# Patient Record
Sex: FEMALE | Race: WHITE | NOT HISPANIC OR LATINO | Employment: OTHER | URBAN - METROPOLITAN AREA
[De-identification: names, ages, dates, MRNs, and addresses within clinical notes are randomized per-mention and may not be internally consistent; named-entity substitution may affect disease eponyms.]

---

## 2020-08-13 ENCOUNTER — HOSPITAL ENCOUNTER (OUTPATIENT)
Facility: HOSPITAL | Age: 75
Setting detail: OBSERVATION
Discharge: HOME | End: 2020-08-14
Attending: EMERGENCY MEDICINE | Admitting: INTERNAL MEDICINE
Payer: COMMERCIAL

## 2020-08-13 DIAGNOSIS — T78.40XA ALLERGIC REACTION, INITIAL ENCOUNTER: ICD-10-CM

## 2020-08-13 DIAGNOSIS — K13.79 UVULAR SWELLING: ICD-10-CM

## 2020-08-13 DIAGNOSIS — K13.79 UVULAR EDEMA: Primary | ICD-10-CM

## 2020-08-13 PROBLEM — T78.2XXA ANAPHYLAXIS: Status: ACTIVE | Noted: 2020-08-13

## 2020-08-13 LAB
ALBUMIN SERPL BCP-MCNC: 3.7 G/DL (ref 3.5–5)
ALP SERPL-CCNC: 76 U/L (ref 46–116)
ALT SERPL W P-5'-P-CCNC: 17 U/L (ref 12–78)
ANION GAP SERPL CALCULATED.3IONS-SCNC: 15 MMOL/L (ref 4–13)
AST SERPL W P-5'-P-CCNC: 11 U/L (ref 5–45)
BASOPHILS # BLD AUTO: 0.05 THOUSANDS/ΜL (ref 0–0.1)
BASOPHILS NFR BLD AUTO: 0 % (ref 0–1)
BILIRUB SERPL-MCNC: 0.19 MG/DL (ref 0.2–1)
BUN SERPL-MCNC: 16 MG/DL (ref 5–25)
CALCIUM SERPL-MCNC: 8.5 MG/DL (ref 8.3–10.1)
CHLORIDE SERPL-SCNC: 104 MMOL/L (ref 100–108)
CO2 SERPL-SCNC: 19 MMOL/L (ref 21–32)
CREAT SERPL-MCNC: 1.39 MG/DL (ref 0.6–1.3)
EOSINOPHIL # BLD AUTO: 0.01 THOUSAND/ΜL (ref 0–0.61)
EOSINOPHIL NFR BLD AUTO: 0 % (ref 0–6)
ERYTHROCYTE [DISTWIDTH] IN BLOOD BY AUTOMATED COUNT: 12.4 % (ref 11.6–15.1)
GFR SERPL CREATININE-BSD FRML MDRD: 37 ML/MIN/1.73SQ M
GLUCOSE SERPL-MCNC: 120 MG/DL (ref 65–140)
HCT VFR BLD AUTO: 36.2 % (ref 34.8–46.1)
HGB BLD-MCNC: 11.6 G/DL (ref 11.5–15.4)
IMM GRANULOCYTES # BLD AUTO: 0.05 THOUSAND/UL (ref 0–0.2)
IMM GRANULOCYTES NFR BLD AUTO: 0 % (ref 0–2)
LYMPHOCYTES # BLD AUTO: 0.94 THOUSANDS/ΜL (ref 0.6–4.47)
LYMPHOCYTES NFR BLD AUTO: 8 % (ref 14–44)
MCH RBC QN AUTO: 29.8 PG (ref 26.8–34.3)
MCHC RBC AUTO-ENTMCNC: 32 G/DL (ref 31.4–37.4)
MCV RBC AUTO: 93 FL (ref 82–98)
MONOCYTES # BLD AUTO: 0.46 THOUSAND/ΜL (ref 0.17–1.22)
MONOCYTES NFR BLD AUTO: 4 % (ref 4–12)
NEUTROPHILS # BLD AUTO: 10.12 THOUSANDS/ΜL (ref 1.85–7.62)
NEUTS SEG NFR BLD AUTO: 88 % (ref 43–75)
NRBC BLD AUTO-RTO: 0 /100 WBCS
PLATELET # BLD AUTO: 206 THOUSANDS/UL (ref 149–390)
PMV BLD AUTO: 10.4 FL (ref 8.9–12.7)
POTASSIUM SERPL-SCNC: 3.8 MMOL/L (ref 3.5–5.3)
PROT SERPL-MCNC: 7 G/DL (ref 6.4–8.2)
RBC # BLD AUTO: 3.89 MILLION/UL (ref 3.81–5.12)
SODIUM SERPL-SCNC: 138 MMOL/L (ref 136–145)
WBC # BLD AUTO: 11.63 THOUSAND/UL (ref 4.31–10.16)

## 2020-08-13 PROCEDURE — 94644 CONT INHLJ TX 1ST HOUR: CPT

## 2020-08-13 PROCEDURE — 94664 DEMO&/EVAL PT USE INHALER: CPT

## 2020-08-13 PROCEDURE — 96374 THER/PROPH/DIAG INJ IV PUSH: CPT

## 2020-08-13 PROCEDURE — 96375 TX/PRO/DX INJ NEW DRUG ADDON: CPT

## 2020-08-13 PROCEDURE — 96361 HYDRATE IV INFUSION ADD-ON: CPT

## 2020-08-13 PROCEDURE — 99285 EMERGENCY DEPT VISIT HI MDM: CPT | Performed by: EMERGENCY MEDICINE

## 2020-08-13 PROCEDURE — 85025 COMPLETE CBC W/AUTO DIFF WBC: CPT | Performed by: PHYSICIAN ASSISTANT

## 2020-08-13 PROCEDURE — 96372 THER/PROPH/DIAG INJ SC/IM: CPT

## 2020-08-13 PROCEDURE — 94762 N-INVAS EAR/PLS OXIMTRY CONT: CPT

## 2020-08-13 PROCEDURE — 94760 N-INVAS EAR/PLS OXIMETRY 1: CPT

## 2020-08-13 PROCEDURE — 80053 COMPREHEN METABOLIC PANEL: CPT | Performed by: PHYSICIAN ASSISTANT

## 2020-08-13 PROCEDURE — 99285 EMERGENCY DEPT VISIT HI MDM: CPT

## 2020-08-13 PROCEDURE — 99220 PR INITIAL OBSERVATION CARE/DAY 70 MINUTES: CPT | Performed by: INTERNAL MEDICINE

## 2020-08-13 PROCEDURE — 36415 COLL VENOUS BLD VENIPUNCTURE: CPT | Performed by: PHYSICIAN ASSISTANT

## 2020-08-13 RX ORDER — DIPHENHYDRAMINE HYDROCHLORIDE 50 MG/ML
50 INJECTION INTRAMUSCULAR; INTRAVENOUS EVERY 8 HOURS
Status: DISCONTINUED | OUTPATIENT
Start: 2020-08-13 | End: 2020-08-14 | Stop reason: HOSPADM

## 2020-08-13 RX ORDER — ACETAMINOPHEN 325 MG/1
650 TABLET ORAL EVERY 6 HOURS PRN
Status: DISCONTINUED | OUTPATIENT
Start: 2020-08-13 | End: 2020-08-14 | Stop reason: HOSPADM

## 2020-08-13 RX ORDER — HEPARIN SODIUM 5000 [USP'U]/ML
5000 INJECTION, SOLUTION INTRAVENOUS; SUBCUTANEOUS EVERY 8 HOURS SCHEDULED
Status: DISCONTINUED | OUTPATIENT
Start: 2020-08-13 | End: 2020-08-14 | Stop reason: HOSPADM

## 2020-08-13 RX ORDER — PRAVASTATIN SODIUM 40 MG
40 TABLET ORAL
Status: DISCONTINUED | OUTPATIENT
Start: 2020-08-13 | End: 2020-08-14 | Stop reason: HOSPADM

## 2020-08-13 RX ORDER — METHYLPREDNISOLONE SODIUM SUCCINATE 40 MG/ML
40 INJECTION, POWDER, LYOPHILIZED, FOR SOLUTION INTRAMUSCULAR; INTRAVENOUS EVERY 12 HOURS SCHEDULED
Status: DISCONTINUED | OUTPATIENT
Start: 2020-08-14 | End: 2020-08-14 | Stop reason: HOSPADM

## 2020-08-13 RX ORDER — SIMVASTATIN 20 MG
20 TABLET ORAL
COMMUNITY

## 2020-08-13 RX ORDER — ONDANSETRON 2 MG/ML
4 INJECTION INTRAMUSCULAR; INTRAVENOUS EVERY 6 HOURS PRN
Status: DISCONTINUED | OUTPATIENT
Start: 2020-08-13 | End: 2020-08-14 | Stop reason: HOSPADM

## 2020-08-13 RX ORDER — LISINOPRIL 10 MG/1
10 TABLET ORAL
COMMUNITY

## 2020-08-13 RX ORDER — SODIUM CHLORIDE FOR INHALATION 0.9 %
12 VIAL, NEBULIZER (ML) INHALATION ONCE
Status: COMPLETED | OUTPATIENT
Start: 2020-08-13 | End: 2020-08-13

## 2020-08-13 RX ORDER — ALBUTEROL SULFATE 2.5 MG/3ML
2.5 SOLUTION RESPIRATORY (INHALATION) EVERY 6 HOURS PRN
Status: DISCONTINUED | OUTPATIENT
Start: 2020-08-13 | End: 2020-08-14 | Stop reason: HOSPADM

## 2020-08-13 RX ORDER — KETOROLAC TROMETHAMINE 30 MG/ML
15 INJECTION, SOLUTION INTRAMUSCULAR; INTRAVENOUS ONCE
Status: COMPLETED | OUTPATIENT
Start: 2020-08-13 | End: 2020-08-13

## 2020-08-13 RX ORDER — LISINOPRIL 10 MG/1
10 TABLET ORAL
Status: DISCONTINUED | OUTPATIENT
Start: 2020-08-14 | End: 2020-08-14 | Stop reason: HOSPADM

## 2020-08-13 RX ORDER — SODIUM CHLORIDE 9 MG/ML
125 INJECTION, SOLUTION INTRAVENOUS CONTINUOUS
Status: DISCONTINUED | OUTPATIENT
Start: 2020-08-13 | End: 2020-08-14

## 2020-08-13 RX ORDER — EPINEPHRINE 1 MG/ML
0.3 INJECTION, SOLUTION, CONCENTRATE INTRAVENOUS ONCE
Status: COMPLETED | OUTPATIENT
Start: 2020-08-13 | End: 2020-08-13

## 2020-08-13 RX ORDER — LEVOTHYROXINE SODIUM 88 UG/1
88 TABLET ORAL
Status: DISCONTINUED | OUTPATIENT
Start: 2020-08-14 | End: 2020-08-14 | Stop reason: HOSPADM

## 2020-08-13 RX ADMIN — ALBUTEROL SULFATE 10 MG: 2.5 SOLUTION RESPIRATORY (INHALATION) at 12:52

## 2020-08-13 RX ADMIN — SODIUM CHLORIDE 125 ML/HR: 0.9 INJECTION, SOLUTION INTRAVENOUS at 18:00

## 2020-08-13 RX ADMIN — PRAVASTATIN SODIUM 40 MG: 40 TABLET ORAL at 18:12

## 2020-08-13 RX ADMIN — FAMOTIDINE 20 MG: 10 INJECTION INTRAVENOUS at 12:44

## 2020-08-13 RX ADMIN — ISODIUM CHLORIDE 12 ML: 0.03 SOLUTION RESPIRATORY (INHALATION) at 12:52

## 2020-08-13 RX ADMIN — FAMOTIDINE 20 MG: 10 INJECTION INTRAVENOUS at 18:09

## 2020-08-13 RX ADMIN — HEPARIN SODIUM 5000 UNITS: 5000 INJECTION INTRAVENOUS; SUBCUTANEOUS at 21:15

## 2020-08-13 RX ADMIN — SODIUM CHLORIDE 1000 ML: 0.9 INJECTION, SOLUTION INTRAVENOUS at 12:42

## 2020-08-13 RX ADMIN — KETOROLAC TROMETHAMINE 15 MG: 30 INJECTION, SOLUTION INTRAMUSCULAR at 12:47

## 2020-08-13 RX ADMIN — DIPHENHYDRAMINE HYDROCHLORIDE 50 MG: 50 INJECTION, SOLUTION INTRAMUSCULAR; INTRAVENOUS at 18:51

## 2020-08-13 RX ADMIN — EPINEPHRINE 0.3 MG: 1 INJECTION, SOLUTION, CONCENTRATE INTRAVENOUS at 12:42

## 2020-08-13 RX ADMIN — HEPARIN SODIUM 5000 UNITS: 5000 INJECTION INTRAVENOUS; SUBCUTANEOUS at 18:14

## 2020-08-13 RX ADMIN — IPRATROPIUM BROMIDE 1 MG: 0.5 SOLUTION RESPIRATORY (INHALATION) at 12:52

## 2020-08-13 NOTE — ASSESSMENT & PLAN NOTE
· Monitor respiratory status closely in the setting of anaphylactic reaction  · No wheezing at this time, per ER notation was wheezing on arrival

## 2020-08-13 NOTE — ASSESSMENT & PLAN NOTE
· Continue lisinopril with hold parameters  · 151/65  · Monitor for any evidence of hypotension  · IVF hydration  · Frequent BP checks

## 2020-08-13 NOTE — ED PROVIDER NOTES
History  Chief Complaint   Patient presents with    Allergic Reaction     Pt has allergy shots today  New dust mite serum  Attempted to go home but first symptom was itching on roof of mouth/throat and eye swelling  Pt had 2 subq doses of epi(0 1mg), 50mg benadryl IM, and 80mg decadrom IM in the office  Pt had an additional 50mg benadryl IV via EMS  HPI   78-year-old female with history of asthma, hypothyroidism, hyperlipidemia, hypertension presenting to the emergency department with acute onset of eye swelling, mouth/throat itching, difficulty swallowing, muffled voice 10-15 minutes after she received an allergy shot today  Patient received a new dust mite serum  When symptoms 1st started, she returned to the allergist office where she received 2 subQ doses of 0 1 mg epinephrine, 50 mg Benadryl IV, and 80 mg IM Decadron at the office  When symptoms did not immediately resolve, EMS was called  Patient received an additional 50 mg IV Benadryl by EMS  Upon arrival, patient complains of a slightly swollen sensation in the back of her throat and eyelid swelling  Denies chest pain, shortness of breath  Prior to this, patient was in her state of usual health  Denies recent history of fever, headache, visual changes, neck pain, cough, sore throat, nausea, vomiting, abdominal pain, urinary symptoms, changes in stool, leg pain or leg swelling, rash  No sick contacts, no recent travel  Patient states that she has never had an allergy reaction or anaphylactic reaction like this before  Prior to Admission Medications   Prescriptions Last Dose Informant Patient Reported? Taking?    Albuterol Sulfate (PROAIR HFA IN)   Yes Yes   Sig: Inhale   Levothyroxine Sodium (SYNTHROID PO)   Yes Yes   Sig: Take 88 mcg by mouth   lisinopril (ZESTRIL) 10 mg tablet   Yes Yes   Sig: Take 10 mg by mouth daily   simvastatin (ZOCOR) 20 mg tablet   Yes Yes   Sig: Take 20 mg by mouth daily at bedtime      Facility-Administered Medications: None       Past Medical History:   Diagnosis Date    Asthma     Hyperlipemia     Hypertension     Hypothyroidism        Past Surgical History:   Procedure Laterality Date    CHOLECYSTECTOMY      TUBAL LIGATION         History reviewed  No pertinent family history  I have reviewed and agree with the history as documented  E-Cigarette/Vaping     E-Cigarette/Vaping Substances     Social History     Tobacco Use    Smoking status: Never Smoker    Smokeless tobacco: Never Used   Substance Use Topics    Alcohol use: Yes     Comment: occ    Drug use: Never       Review of Systems   Constitutional: Negative for chills and fever  HENT: Positive for facial swelling, trouble swallowing and voice change  Pruritus   Respiratory: Negative for apnea, cough, choking, chest tightness, shortness of breath, wheezing and stridor  Cardiovascular: Negative for chest pain and leg swelling  Gastrointestinal: Negative for abdominal distention, abdominal pain, constipation, diarrhea, nausea and rectal pain  Genitourinary: Negative for dysuria and hematuria  Musculoskeletal: Negative for back pain, gait problem and neck pain  Skin: Negative for color change, pallor, rash and wound  Neurological: Negative for dizziness, tremors, seizures, syncope, facial asymmetry, speech difficulty, weakness, light-headedness, numbness and headaches  Physical Exam  Physical Exam  Vitals signs and nursing note reviewed  Constitutional:       General: She is not in acute distress  Appearance: She is well-developed  She is not diaphoretic  Comments: Patient speaking with slightly muffled voice, speaking full sentences   HENT:      Head: Normocephalic and atraumatic  Comments: Right periorbital swelling     Right Ear: External ear normal       Left Ear: External ear normal       Nose: Nose normal       Mouth/Throat:      Pharynx: Pharyngeal swelling present        Comments: Bilateral tonsil swelling  Eyes:      General:         Right eye: No discharge  Left eye: No discharge  Conjunctiva/sclera: Conjunctivae normal       Pupils: Pupils are equal, round, and reactive to light  Neck:      Musculoskeletal: Normal range of motion and neck supple  Cardiovascular:      Rate and Rhythm: Normal rate and regular rhythm  Heart sounds: Normal heart sounds  No murmur  No friction rub  No gallop  Pulmonary:      Effort: Pulmonary effort is normal  No respiratory distress  Breath sounds: No stridor  Wheezing present  No rales  Chest:      Chest wall: No tenderness  Abdominal:      General: Bowel sounds are normal  There is no distension  Palpations: Abdomen is soft  There is no mass  Tenderness: There is no abdominal tenderness  There is no guarding or rebound  Hernia: No hernia is present  Musculoskeletal: Normal range of motion  General: No tenderness or deformity  Skin:     General: Skin is warm and dry  Capillary Refill: Capillary refill takes less than 2 seconds  Neurological:      Mental Status: She is alert and oriented to person, place, and time           Vital Signs  ED Triage Vitals   Temperature Pulse Respirations Blood Pressure SpO2   08/13/20 1224 08/13/20 1224 08/13/20 1224 08/13/20 1224 08/13/20 1224   98 7 °F (37 1 °C) 95 16 156/67 98 %      Temp Source Heart Rate Source Patient Position - Orthostatic VS BP Location FiO2 (%)   08/13/20 1224 08/13/20 1224 08/13/20 1224 08/13/20 1224 --   Oral Monitor Sitting Right arm       Pain Score       08/13/20 1340       No Pain           Vitals:    08/13/20 1224 08/13/20 1454   BP: 156/67 151/65   Pulse: 95 (!) 117   Patient Position - Orthostatic VS: Sitting Sitting         Visual Acuity      ED Medications  Medications   diphenhydrAMINE (FOR EMS ONLY) (BENADRYL) injection 50 mg (0 mg Does not apply Given to EMS 8/13/20 1222)   albuterol inhalation solution 10 mg (10 mg Nebulization Given 8/13/20 1252)   ipratropium (ATROVENT) 0 02 % inhalation solution 1 mg (1 mg Nebulization Given 8/13/20 1252)   sodium chloride 0 9 % inhalation solution 12 mL (12 mL Nebulization Given 8/13/20 1252)   EPINEPHrine PF (ADRENALIN) 1 mg/mL injection 0 3 mg (0 3 mg Subcutaneous Given 8/13/20 1242)   famotidine (PEPCID) injection 20 mg (20 mg Intravenous Given 8/13/20 1244)   ketorolac (TORADOL) injection 15 mg (15 mg Intravenous Given 8/13/20 1247)   sodium chloride 0 9 % bolus 1,000 mL (0 mL Intravenous Stopped 8/13/20 1454)       Diagnostic Studies  Results Reviewed     Procedure Component Value Units Date/Time    CBC and differential [[id number]]     Lab Status:  No result Specimen:  Blood     Comprehensive metabolic panel [[id number]]     Lab Status:  No result Specimen:  Blood                  No orders to display              Procedures  Procedures         ED Course  ED Course as of Aug 13 1553   Thu Aug 13, 2020   1224 Vital signs upon arrival within normal limits  Blood Pressure: 156/67   1236 Physical exam is remarkable for older female with significant periorbital right eye swelling and tonsillar swelling  Wheezing at the bases of lungs  1237 Epinephrine, albuterol, Pepcid ordered for patient  Patient received 50 mg p o  Benadryl and 80 mg p o  Decadron via EMS prior to arrival       1243 Differential diagnosis includes allergic reaction, anaphylaxis, serum sickness, asthma, peritonsillar abscess  Lab work at this time is not indicated as this appears to be an allergic reaction  1244 Albuterol, epinephrine, Pepcid, Toradol ordered for patient  Will continue to monitor  1501 Upon reassessment, patient's swelling has progressed to the uvula  Swelling of the palatopharyngeal arches has remained stable  [id number] Patient has no stridor, is speaking full sentences  [id number] ENT paged for further evaluation  1553 Case discussed with Dr Lucrecia Aranda for admission  MDM      Disposition  Final diagnoses: Allergic reaction, initial encounter   Uvular swelling     Time reflects when diagnosis was documented in both MDM as applicable and the Disposition within this note     Time User Action Codes Description Comment    8/13/2020  3:17 PM Jb Mylar Add [K13 79] Uvular edema     8/13/2020  3:52 PM Marianna Meza Add [T78 40XA] Allergic reaction, initial encounter     8/13/2020  3:52 PM Marianna Meza Add [K13 79] Uvular swelling       ED Disposition     ED Disposition Condition Date/Time Comment    Admit Stable Thu Aug 13, 2020  3:52 PM Case was discussed with Dr Corky Brooks and the patient's admission status was agreed to be Admission Status: observation status to the service of Dr Corky Brooks   Follow-up Information    None         Patient's Medications   Discharge Prescriptions    No medications on file     No discharge procedures on file      PDMP Review     None          ED Provider  Electronically Signed by           Phil Benoit MD  08/13/20 3058

## 2020-08-13 NOTE — CONSULTS
Consultation - ENT   Tej Pickett 67 y o  female MRN: [id number]  Unit/Bed#: ED 17 Encounter: [id number]      Assessment/Plan     Assessment: Anaphylactic reaction due to allergy extract injection, presently with moderate pharyngeal and supraglottic edema  Vocal folds symmetric and fully mobile  Plan:  Discussed with Dr Bea Talbert (on call)  Concur with admission for observation, step-down unit  Recommend continued IV steroids, and H1 and H2 blockers throughout the night to cover potential late-phase reaction  History of Present Illness   Physician Requesting Consult: Jerald Hong MD  Reason for Consult / Principal Problem: Anaphylaxis, pharyngeal edema      HPI: Tej Pickett is a 67y o  year old female who was brought by EMS to [place] [address]  after experiencing somewhat acute onset of itching and fullness in her throat about 25-30 minutes following administration of injection of dust mite allergy extract by her PCP  She states that she had no immediate reaction, waited her usual 15-20 minutes following her shot, then departed the office  She noted the itching and fullness in her throat moments after leaving the office, which worsened while driving home, prompting her to return to her PCP about 10 minutes later  It was quickly determined that she was experiencing an anaphylactic reaction, and she states epinephrine was administered, as well as Benedryl 50mg, and Decadron 80mg  EMS arrived, and reportedly gave her additional Benedryl 50mg  She was in turn brought to the ER, where palatopharyngeal and uvular edema was noted  She also had wheezing on presentation to the ER, but presently denies SOB, cough,  lightheadedness, dizziness, or having had LOC  She does have a significant history of asthma, as well as HTN, hyperlipidemia, and hypothyroidism      Inpatient consult to ENT  Consult performed by: Juventino Ball PA-C  Consult ordered by: Amrit Sultana PA-C          Review of Systems    Historical Information   Past Medical History:   Diagnosis Date    Asthma     Hyperlipemia     Hypertension     Hypothyroidism      Past Surgical History:   Procedure Laterality Date    CHOLECYSTECTOMY      TUBAL LIGATION       Social History   Social History     Substance and Sexual Activity   Alcohol Use Yes    Comment: occ     Social History     Substance and Sexual Activity   Drug Use Never     Social History     Tobacco Use   Smoking Status Never Smoker   Smokeless Tobacco Never Used     Family History: non-contributory    Meds/Allergies   all current active meds have been reviewed    Allergies   Allergen Reactions    Sulfa Antibiotics Hives    Erythromycin Rash       Objective       Intake/Output Summary (Last 24 hours) at 8/13/2020 1709  Last data filed at 8/13/2020 1454  Gross per 24 hour   Intake 1000 ml   Output --   Net 1000 ml       Invasive Devices     Peripheral Intravenous Line            Peripheral IV 08/13/20 Left Antecubital less than 1 day                Physical Exam  The patient was examined in ER bed 17  She is alert, on RA, in NAD  Speech is clear  No cough, stridor, or audible wheezing  No excessive drooling  Sclera moist, no conjunctival injection OU  Right EAC patent, Left EAC with moderate cerumen  TM's intact without apparent middle ear effusions  Nares patent  Boggy nasal mucosa is noted bilaterally on anterior rhinoscopy  Lips appear normal   Oral mucosa moist, tongue mobile and does not appear swollen  However, the patient has moderate pharyngeal edema, to include the uvula, without erythema  Tonsils are 1+ bilaterally  The posterior pharyngeal wall appears grossly normal   Neck is supple, non-tender  Trachea is grossly midline  Flexible Fiberoptic Nasolaryngoscopy: The patient is in an upright seated position  A blend of oxymetazoline and lidocaine 4% plain was instilled into each nasal cavity utilizing a pipette    A flexible fiberoptic endoscope was then passed into the patient Right nasal cavity, to the nasopharynx, then to the larynx  Findings:  Nasopharynx unremarkable, Eustachian tube os patent bilaterally  There is moderate diffuse supraglottic edema  No discernable lesions or masses  TVC's are fully mobile and symmetric, and one upper tracheal ring is readily visible  Post-cricoid region is grossly unremarkable  The scope was gently removed, and the Left nasal cavity was also examined  The patient tolerated the examination well, and there were no complications  Lab Results: I have personally reviewed pertinent lab results      Imaging Studies:   EKG, Pathology, and Other Studies:

## 2020-08-13 NOTE — ASSESSMENT & PLAN NOTE
· Secondary to allergic injection at PCP office today (dust mite serum injection prescribed by allergist and administered in PCP office)  · Has been on escalating doses since June/July of the same injection  · Was wheezing on arrival to the ER + uvular edema  No resp distress  · Status post 2 doses of epinephrine 0 1 mg at the PCP office and then 0 3 mg in the ER (at 1242), 100 mg of IV Benadryl, 20 mg of IV Pepcid and 1 L of NSS  · Also received albuterol, Atrovent and Toradol  · Continue IV pepcid and IV benadryl  · Add IV solu medrol (received decadron at around 11 AM)  · ENT consulted in ER and to perform nasopharyngoscopy  · Admit to SD2 for close resp status monitoring    · Agreeable to intubation should she require airway protection  · Outpatient follow up with allergist

## 2020-08-13 NOTE — H&P
[facility]  H&P- Pastora Cochran 1945, 67 y o  female MRN: [id number]  Unit/Bed#: ED 17 Encounter: [id number]  Primary Care Provider: No primary care provider on file  Date and time admitted to hospital: 8/13/2020 12:13 PM    * Anaphylaxis  Assessment & Plan  · Secondary to allergic injection at PCP office today (dust mite serum injection prescribed by allergist and administered in PCP office)  · Has been on escalating doses since June/July of the same injection  · Was wheezing on arrival to the ER + uvular edema  No resp distress  · Status post 2 doses of epinephrine 0 1 mg at the PCP office and then 0 3 mg in the ER (at 1242), 80 mg methlprednisolone, 50 mg x 2 of IV Benadryl, 20 mg of IV Pepcid and 1 L of NSS  · Also received albuterol, Atrovent and Toradol  · Continue IV pepcid and IV benadryl  · Add IV solu medrol (received decadron at around 11 AM) starting tomorrow  · ENT consulted in ER and to perform nasopharyngoscopy  · Npo until ENT eval - then do full liquids and advance as tolerated  · Admit to SD2 for close resp status monitoring    · Agreeable to intubation should she require airway protection  · Outpatient follow up with allergist    Hypertension  Assessment & Plan  · Continue lisinopril with hold parameters  · 151/65  · Monitor for any evidence of hypotension  · IVF hydration  · Frequent BP checks    Hypothyroidism  Assessment & Plan  · Continue synthroid    Hyperlipemia  Assessment & Plan  · Continue zocor    Asthma  Assessment & Plan  · Monitor respiratory status closely in the setting of anaphylactic reaction  · No wheezing at this time, per ER notation was wheezing on arrival      VTE Prophylaxis: Heparin  / sequential compression device   Code Status: FULL CODE  POLST: There is no POLST form on file for this patient (pre-hospital)  Discussion with family: patient    Anticipated Length of Stay:  Patient will be admitted on an Observation basis with an anticipated length of stay of  < 2 midnights  Justification for Hospital Stay: anaphylactic reaction    Total Time for Visit, including Counseling / Coordination of Care: 1 hour  Greater than 50% of this total time spent on direct patient counseling and coordination of care  Chief Complaint:   Allergic reaction    History of Present Illness:    Mary Kay Marina is a 67 y o  female who presents with allergic reaction  She has past medical history hypertension, asthma, hyperlipidemia, hypothyroidism and anxiety  She was noted to be at her PCP office today and was noted to get an allergy shot for dust mites  She had been previously seen by an allergist and was noted to be recommended allergy shots for dust via injection  This was noted to be administered at PCPs office given it is closer to her  She was noted to receive escalating dosages of the same dust injection since June to July  Her typical processes to receive the injection, wait 15-20 minutes and then is allowed to leave  She was noted to wait this period of time, then went to her car and developed a sensation that her throat is scratchy  She was noted to drive however then developed I edema and worsening throat scratching and full sensation and therefore she turned around with back to the PCP office  She felt like she had a muffled voice  Her throat was itchy and her eyes continued to swell  The patient was noted to receive 50 mg of Benadryl and 80 mg of Decadron while in the office  EMS was noted to arrive gave the patient additional 50 mg of IV Benadryl and subsequently treated with epinephrine  Upon arrival to the emergency department the patient had a swollen sensation to the back of her throat  She was given another dose of epinephrine, as well as IV Pepcid, albuterol, Atrovent, Toradol, and 1L normal saline  At this point she reports that she feels still feels a dry and full sensation in the back of her throat    She reports that her eye swelling has improved  She has no urticaria  Review of Systems:    Review of Systems   Constitutional: Positive for fatigue  Negative for chills, diaphoresis, fever and unexpected weight change  HENT: Positive for facial swelling, sore throat, trouble swallowing and voice change  Eyes: Positive for itching  Respiratory: Positive for shortness of breath and wheezing  Negative for cough and chest tightness  Cardiovascular: Negative for chest pain, palpitations and leg swelling  Gastrointestinal: Negative for abdominal pain, diarrhea, nausea and vomiting  Genitourinary: Negative for dysuria  Musculoskeletal: Negative for myalgias  Neurological: Positive for weakness  Negative for dizziness, syncope, numbness and headaches  Psychiatric/Behavioral: Negative for confusion  All other systems reviewed and are negative  Past Medical and Surgical History:     Past Medical History:   Diagnosis Date    Asthma     Hyperlipemia     Hypertension     Hypothyroidism        Past Surgical History:   Procedure Laterality Date    CHOLECYSTECTOMY      TUBAL LIGATION         Meds/Allergies:    Prior to Admission medications    Medication Sig Start Date End Date Taking? Authorizing Provider   Albuterol Sulfate (PROAIR HFA IN) Inhale   Yes Historical Provider, MD   Levothyroxine Sodium (SYNTHROID PO) Take 88 mcg by mouth   Yes Historical Provider, MD   lisinopril (ZESTRIL) 10 mg tablet Take 10 mg by mouth daily   Yes Historical Provider, MD   simvastatin (ZOCOR) 20 mg tablet Take 20 mg by mouth daily at bedtime   Yes Historical Provider, MD     I have reviewed home medications with patient personally  Allergies:    Allergies   Allergen Reactions    Sulfa Antibiotics Hives    Erythromycin Rash       Social History:     Marital Status: /Civil Union   Occupation: retired  Patient Pre-hospital Living Situation: at home  Patient Pre-hospital Level of Mobility: no restrictions  Patient Pre-hospital Diet Restrictions: none  Substance Use History:   Social History     Substance and Sexual Activity   Alcohol Use Yes    Comment: occ     Social History     Tobacco Use   Smoking Status Never Smoker   Smokeless Tobacco Never Used     Social History     Substance and Sexual Activity   Drug Use Never       Family History:    History reviewed  No pertinent family history  Physical Exam:     Vitals:   Blood Pressure: 151/65 (08/13/20 1454)  Pulse: (!) 117 (08/13/20 1454)  Temperature: 98 7 °F (37 1 °C) (08/13/20 1224)  Temp Source: Oral (08/13/20 1224)  Respirations: 16 (08/13/20 1454)  Weight - Scale: 70 kg (154 lb 5 2 oz) (08/13/20 1224)  SpO2: 98 % (08/13/20 1454)    Physical Exam  Vitals signs and nursing note reviewed  Constitutional:       General: She is not in acute distress  Appearance: Normal appearance  She is not diaphoretic  HENT:      Head: Normocephalic and atraumatic  Mouth/Throat:      Mouth: Mucous membranes are moist      Eyes:      General: No scleral icterus  Right eye: No foreign body or discharge  Left eye: No foreign body or discharge  Extraocular Movements:      Right eye: Normal extraocular motion and no nystagmus  Left eye: Normal extraocular motion and no nystagmus  Cardiovascular:      Rate and Rhythm: Regular rhythm  Tachycardia present  Pulmonary:      Effort: Pulmonary effort is normal  No respiratory distress  Breath sounds: Normal breath sounds  No stridor  No wheezing, rhonchi or rales  Comments: Good air movement  No wheezes rales or rhonchi  Able to speak in full sentences  No stridor  No respiratory distress  Abdominal:      General: Bowel sounds are normal       Palpations: Abdomen is soft  There is no mass  Tenderness: There is no abdominal tenderness  There is no guarding  Musculoskeletal:      Right lower leg: No edema  Left lower leg: No edema  Skin:     General: Skin is warm and dry  Findings: No rash  Comments: No urticaria   Neurological:      Mental Status: She is alert  Psychiatric:         Mood and Affect: Mood normal          Behavior: Behavior normal          Additional Data:     Lab Results: No labs obtained at this time    Imaging: None    EKG, Pathology, and Other Studies Reviewed on Admission:   · [facility] records reviewed    [facility] Records Reviewed: Yes     ** Please Note: This note has been constructed using a voice recognition system   **

## 2020-08-14 VITALS
HEIGHT: 62 IN | RESPIRATION RATE: 25 BRPM | SYSTOLIC BLOOD PRESSURE: 151 MMHG | OXYGEN SATURATION: 95 % | BODY MASS INDEX: 27.87 KG/M2 | DIASTOLIC BLOOD PRESSURE: 68 MMHG | TEMPERATURE: 98.6 F | HEART RATE: 78 BPM | WEIGHT: 151.46 LBS

## 2020-08-14 PROCEDURE — 99217 PR OBSERVATION CARE DISCHARGE MANAGEMENT: CPT | Performed by: INTERNAL MEDICINE

## 2020-08-14 RX ORDER — DIPHENHYDRAMINE HCL 50 MG
50 CAPSULE ORAL EVERY 8 HOURS
Qty: 30 CAPSULE | Refills: 0
Start: 2020-08-14 | End: 2020-08-17

## 2020-08-14 RX ORDER — PREDNISONE 20 MG/1
40 TABLET ORAL DAILY
Qty: 6 TABLET | Refills: 0 | Status: SHIPPED | OUTPATIENT
Start: 2020-08-14 | End: 2020-08-17

## 2020-08-14 RX ORDER — FAMOTIDINE 20 MG/1
20 TABLET, FILM COATED ORAL DAILY
Qty: 3 TABLET | Refills: 0 | Status: SHIPPED | OUTPATIENT
Start: 2020-08-14 | End: 2020-08-17

## 2020-08-14 RX ADMIN — SODIUM CHLORIDE 125 ML/HR: 0.9 INJECTION, SOLUTION INTRAVENOUS at 01:28

## 2020-08-14 RX ADMIN — DIPHENHYDRAMINE HYDROCHLORIDE 50 MG: 50 INJECTION, SOLUTION INTRAMUSCULAR; INTRAVENOUS at 01:28

## 2020-08-14 RX ADMIN — METHYLPREDNISOLONE SODIUM SUCCINATE 40 MG: 40 INJECTION, POWDER, FOR SOLUTION INTRAMUSCULAR; INTRAVENOUS at 05:40

## 2020-08-14 RX ADMIN — LEVOTHYROXINE SODIUM 88 MCG: 88 TABLET ORAL at 07:22

## 2020-08-14 RX ADMIN — DIPHENHYDRAMINE HYDROCHLORIDE 50 MG: 50 INJECTION, SOLUTION INTRAMUSCULAR; INTRAVENOUS at 09:36

## 2020-08-14 RX ADMIN — HEPARIN SODIUM 5000 UNITS: 5000 INJECTION INTRAVENOUS; SUBCUTANEOUS at 05:40

## 2020-08-14 NOTE — NURSING NOTE
Discharge instructions gone over with patient and patient's   Patient discharged with   Patient stable at time of discharge

## 2020-08-14 NOTE — UTILIZATION REVIEW
Initial Clinical Review    Admission: Date/Time/Statement:   Admission Orders (From admission, onward)     Ordered        08/13/20 1553  Place in Observation  Once                   Orders Placed This Encounter   Procedures    Place in Observation     Standing Status:   Standing     Number of Occurrences:   1     Order Specific Question:   Admitting Physician     Answer:   John Leon     Order Specific Question:   Level of Care     Answer:   Level 2 Stepdown / HOT [14]     ED Arrival Information     Expected Arrival Acuity Means of Arrival Escorted By Service Admission Type    - 8/13/2020 12:13 Emergent Ambulance [facility] General Medicine Emergency    Arrival Complaint    ALLERGIC REACTION        Chief Complaint   Patient presents with    Allergic Reaction     Pt has allergy shots today  New dust mite serum  Attempted to go home but first symptom was itching on roof of mouth/throat and eye swelling  Pt had 2 subq doses of epi(0 1mg), 50mg benadryl IM, and 80mg decadrom IM in the office  Pt had an additional 50mg benadryl IV via EMS  Assessment/Plan: 68 yo female to ED by EMS admitted as Observation due to allergic reaction  PMHx hypertension, asthma, hyperlipidemia, hypothyroidism and anxiety  Patient presents with acute onset of eye swelling, mouth/throat itching, difficulty swallowing with muffled voice after she received an allergy shot-dust mite serum at PCP today  Once reaction began she received 2 sub Q doses of 0 1mg Epinephrine, 50 mg Benadryl IV and 80 mg Decadron if the office  Symptoms did not resolve with EMS summoned & she received another 50 mg IV Benadryl  IN ED symptoms incl a slightly swollen sensation in the back of the throat & eyelid swelling  Patient states she has never had an allergy reaction or anaphylactic reaction like this  She received IVF, IV toradol, NEBS, IV Pepcid  Consult ENT  Cont IV pepcid/Benadryl/ IV Solu Medrol/IVF  NPO   Agreeable to intubation for airway protection  Admit to ICu stepdown for monitoring of hemodynamics & resp distress  8/13/2020 per ENTimpression with moderate pharyngeal & supraglottic edema; vocal folds symmetric & fully mobile presently  Cont IV steroids & H1 H2 blockers throughout the night to cover late phase reaction     8/14/2020              ED Triage Vitals   Temperature Pulse Respirations Blood Pressure SpO2   08/13/20 1224 08/13/20 1224 08/13/20 1224 08/13/20 1224 08/13/20 1224   98 7 °F (37 1 °C) 95 16 156/67 98 %      Temp Source Heart Rate Source Patient Position - Orthostatic VS BP Location FiO2 (%)   08/13/20 1224 08/13/20 1224 08/13/20 1224 08/13/20 1224 --   Oral Monitor Sitting Right arm       Pain Score       08/13/20 1340       No Pain          Wt Readings from Last 1 Encounters:   08/13/20 68 7 kg (151 lb 7 3 oz)     Additional Vital Signs:   Date/Time   Temp   Pulse   Resp   BP   MAP (mmHg)   SpO2   O2 Device   Patient Position - Orthostatic VS    08/14/20 0714   98 2 °F (36 8 °C)   79   25Abnormal     147/65   93   97 %   --   Lying    08/13/20 2241   98 °F (36 7 °C)   81   21   137/59   83   97 %   None (Room air)   Lying    08/13/20 2030   --   --   24Abnormal     --   --   --   None (Room air)   --    08/13/20 1900   98 3 °F (36 8 °C)   99   18   137/65   93   99 %   None (Room air)   Sitting    08/13/20 1737   99 °F (37 2 °C)   102   17   152/66   95   98 %   None (Room air)   Sitting    08/13/20 1700   --   106Abnormal     --   138/58   84   97 %   --   --    08/13/20 1600   --   100   --   122/55   79   97 %   None (Room air)   Sitting    08/13/20 1454   --   117Abnormal     16   151/65   --   98 %   None (Room air)   Sitting    08/13/20 1300   --   --   --   --   --   --   None (Room air)   --    08/13/20 1227   --   --   --   --   --   --   None (Room air)   --    08/13/20 1224   98 7 °F (37 1 °C)   95   16   156/67   --   98 %   None (Room air)   Sitting       Weights (last 14 days)     Date/Time Weight   Weight Method   Height    08/13/20 1737   68 7 kg (151 lb 7 3 oz)   Bed scale   5' 2" (1 575 m)    08/13/20 1224   70 kg (154 lb 5 2 oz)   --   --        Pertinent Labs/Diagnostic Test Results:       Results from last 7 days   Lab Units 08/13/20  1657   WBC Thousand/uL 11 63*   HEMOGLOBIN g/dL 11 6   HEMATOCRIT % 36 2   PLATELETS Thousands/uL 206   NEUTROS ABS Thousands/µL 10 12*         Results from last 7 days   Lab Units 08/13/20  1657   SODIUM mmol/L 138   POTASSIUM mmol/L 3 8   CHLORIDE mmol/L 104   CO2 mmol/L 19*   ANION GAP mmol/L 15*   BUN mg/dL 16   CREATININE mg/dL 1 39*   EGFR ml/min/1 73sq m 37   CALCIUM mg/dL 8 5     Results from last 7 days   Lab Units 08/13/20  1657   AST U/L 11   ALT U/L 17   ALK PHOS U/L 76   TOTAL PROTEIN g/dL 7 0   ALBUMIN g/dL 3 7   TOTAL BILIRUBIN mg/dL 0 19*         Results from last 7 days   Lab Units 08/13/20  1657   GLUCOSE RANDOM mg/dL 120             ED Treatment:   Medication Administration from 08/13/2020 1211 to 08/13/2020 1736       Date/Time Order Dose Route Action     08/13/2020 1222 diphenhydrAMINE (FOR EMS ONLY) (BENADRYL) injection 50 mg 0 mg Does not apply Given to EMS     08/13/2020 1252 albuterol inhalation solution 10 mg 10 mg Nebulization Given     08/13/2020 1252 ipratropium (ATROVENT) 0 02 % inhalation solution 1 mg 1 mg Nebulization Given     08/13/2020 1252 sodium chloride 0 9 % inhalation solution 12 mL 12 mL Nebulization Given     08/13/2020 1242 EPINEPHrine PF (ADRENALIN) 1 mg/mL injection 0 3 mg 0 3 mg Subcutaneous Given     08/13/2020 1244 famotidine (PEPCID) injection 20 mg 20 mg Intravenous Given     08/13/2020 1247 ketorolac (TORADOL) injection 15 mg 15 mg Intravenous Given     08/13/2020 1242 sodium chloride 0 9 % bolus 1,000 mL 1,000 mL Intravenous New Bag        Past Medical History:   Diagnosis Date    Asthma     Hyperlipemia     Hypertension     Hypothyroidism      Present on Admission:   Hypertension   Hyperlipemia   Hypothyroidism   Asthma      Admitting Diagnosis: Allergic reaction [T78 40XA]  Uvular edema [K13 79]  Uvular swelling [K13 79]  Allergic reaction, initial encounter [T78 40XA]  Age/Sex: 67 y o  female  Admission Orders:  Cont pulse oximetry  Npo  Up OOB  Scheduled Medications:  diphenhydrAMINE, 50 mg, Intravenous, Q8H  famotidine, 20 mg, Intravenous, Q24H  heparin (porcine), 5,000 Units, Subcutaneous, Q8H JENNIFER  levothyroxine, 88 mcg, Oral, Daily With Breakfast  lisinopril, 10 mg, Oral, HS  methylPREDNISolone sodium succinate, 40 mg, Intravenous, Q12H JENNIFER  pravastatin, 40 mg, Oral, Daily With Dinner      Continuous IV Infusions:     PRN Meds:  acetaminophen, 650 mg, Oral, Q6H PRN  albuterol, 2 5 mg, Nebulization, Q6H PRN  ondansetron, 4 mg, Intravenous, Q6H PRN        IP CONSULT TO ENT    Network Utilization Review Department  [email or web address]  ATTENTION: Please call with any questions or concerns to [phone] and carefully listen to the prompts so that you are directed to the right person  All voicemails are confidential   Sudie Crigler all requests for admission clinical reviews, approved or denied determinations and any other requests to dedicated fax number below belonging to the campus where the patient is receiving treatment   List of dedicated fax numbers for the Facilities:  [address] [place] [address] DENIALS (Administrative/Medical Necessity) [phone]   [address] [place] [address] (Maternity/NICU/Pediatrics) [phone]   Codey Min [phone]   Minnie Sheets [phone]   Daniela Juniper [phone]   Sonya Segura [phone]   [facility] [address] [facility] [phone]   [facility]  [phone]   [address] [facility] [address]  [address] [facility] [address] [facility] [phone]

## 2020-08-14 NOTE — DISCHARGE INSTRUCTIONS
Continue oral steroids for 3 days along with benadryl and pepcid  Return to ER with worsening symptoms  Follow up with allergist Monday 8/17    Anaphylaxis   WHAT YOU NEED TO KNOW:   Anaphylaxis is a life-threatening allergic reaction that must be treated immediately  Your risk for anaphylaxis increases if you have asthma that is severe or not controlled  Medical conditions such as heart disease can also increase your risk  It is important to be prepared if you are at risk for anaphylaxis  Your symptoms can be worse each time you are exposed to the trigger  DISCHARGE INSTRUCTIONS:   Steps to take for signs or symptoms of anaphylaxis:   · Immediately  give 1 shot of epinephrine only into the outer thigh muscle  · Leave the shot in place  as directed  Your healthcare provider may recommend you leave it in place for up to 10 seconds before you remove it  This helps make sure all of the epinephrine is delivered  · Call 911 and go to the emergency department,  even if the shot improved symptoms  Do not drive yourself  Bring the used epinephrine shot with you  Call 911 for any of the following:   · You have a skin rash, hives, swelling, or itching  · You have trouble breathing, shortness of breath, wheezing, or coughing  · Your throat tightens or your lips or tongue swell  · You have difficulty swallowing or speaking  · You are dizzy, lightheaded, confused, or feel like you are going to faint  · You have nausea, diarrhea, or abdominal cramps, or you are vomiting  Seek care immediately if:   · Signs or symptoms of anaphylaxis return  Contact your healthcare provider if:   · You have questions or concerns about your condition or care  Medicines:   · Epinephrine  is used to treat severe allergic reactions such as anaphylaxis  · Medicines  such as antihistamines, steroids, and bronchodilators decrease inflammation, open airways, and make breathing easier      · Take your medicine as directed  Contact your healthcare provider if you think your medicine is not helping or if you have side effects  Tell him or her if you are allergic to any medicine  Keep a list of the medicines, vitamins, and herbs you take  Include the amounts, and when and why you take them  Bring the list or the pill bottles to follow-up visits  Carry your medicine list with you in case of an emergency  Follow up with your healthcare provider as directed: Allergy testing may reveal allergies that can trigger anaphylaxis  Write down your questions so you remember to ask them during your visits  Safety precautions:   · Keep 2 shots of epinephrine with you at all times  You may need a second shot, because epinephrine only works for about 20 minutes and symptoms may return  Your healthcare provider can show you and family members how to give the shot  Check the expiration date every month and replace it before it expires  · Create an action plan  Your healthcare provider can help you create a written plan that explains the allergy and an emergency plan to treat a reaction  The plan explains when to give a second epinephrine shot if symptoms return or do not improve after the first  Give copies of the action plan and emergency instructions to family members, work and school staff, and  providers  Show them how to give a shot of epinephrine  · Be careful when you exercise  If you have had exercise-induced anaphylaxis, do not exercise right after you eat  Stop exercising right away if you start to develop any signs or symptoms of anaphylaxis  You may first feel tired, warm, or have itchy skin  Hives, swelling, and severe breathing problems may develop if you continue to exercise  · Carry medical alert identification  Wear medical alert jewelry or carry a card that explains the allergy  Ask your healthcare provider where to get these items  · Identify and avoid known triggers    Read food labels for ingredients  Look for triggers in your environment  · Ask about treatments to prevent anaphylaxis  You may need allergy shots or other medicines to treat allergies  © 2017 [facility] Information is for End User's use only and may not be sold, redistributed or otherwise used for commercial purposes  All illustrations and images included in CareNotes® are the copyrighted property of A D A M , Inc  or [employer]  The above information is an  only  It is not intended as medical advice for individual conditions or treatments  Talk to your doctor, nurse or pharmacist before following any medical regimen to see if it is safe and effective for you

## 2020-08-14 NOTE — PLAN OF CARE
Problem: Prexisting or High Potential for Compromised Skin Integrity  Goal: Skin integrity is maintained or improved  Description: INTERVENTIONS:  - Identify patients at risk for skin breakdown  - Assess and monitor skin integrity  - Assess and monitor nutrition and hydration status  - Monitor labs   - Assess for incontinence   - Turn and reposition patient  - Assist with mobility/ambulation  - Relieve pressure over bony prominences  - Avoid friction and shearing  - Provide appropriate hygiene as needed including keeping skin clean and dry  - Evaluate need for skin moisturizer/barrier cream  - Collaborate with interdisciplinary team   - Patient/family teaching  - Consider wound care consult   Outcome: Progressing     Problem: Potential for Falls  Goal: Patient will remain free of falls  Description: INTERVENTIONS:  - Assess patient frequently for physical needs  -  Identify cognitive and physical deficits and behaviors that affect risk of falls    -  [place] fall precautions as indicated by assessment   - Educate patient/family on patient safety including physical limitations  - Instruct patient to call for assistance with activity based on assessment  - Modify environment to reduce risk of injury  - Consider OT/PT consult to assist with strengthening/mobility  Outcome: Progressing
Azar Ordonez MD (resident): 51 F w/ breast cancer in 2014 s/p chemo and XRT in 2015, epilepsy, COPD, who p/w similar symptoms from prior ED visit from same day for feeling unwell, sob and sensation of about to have a seizure. After discussion with the previous ED provider Dr. Dupree, pt was monitored in the ED w/o seizure like activity, had no SI/HI, improved symptoms and d/c'd. Pt went to waiting room, did not leave the ED, and re-registered w/ similar reported symptoms at this time. Still without seizure-like activity, SI/HI, or pleuritic chest pain. Not likely to be PE based on H&P, vitals intact, no LE swelling. Will get Psych consult, and Tx anxiety.

## 2020-08-14 NOTE — DISCHARGE SUMMARY
Discharge- Pastora Cochran 1945, 67 y o  female MRN: [id number]    Unit/Bed#: ICU 01 Encounter: [id number]    Primary Care Provider: No primary care provider on file  Date and time admitted to hospital: 8/13/2020 12:13 PM      * Anaphylaxis  Assessment & Plan  Secondary to allergic injection at PCP office (dust mite serum injection prescribed by allergist and administered in PCP office)  · Has been on escalating doses since June/July of the same injection  · Was wheezing on arrival to the ER + uvular edema  No resp distress  · Status post 2 doses of epinephrine 0 1 mg at the PCP office and then 0 3 mg in the ER, 100 mg of IV Benadryl, 20 mg of IV Pepcid and 1 L of NSS  · Also received albuterol, Atrovent and Toradol  · S/P IV steroids, pepcid and benadryl   · ENT consulted in ER and to perform nasopharyngoscopy  · Doing well, d/w ENT  Tolerating diet  Rejeana Lites for discharge on oral steroids/pepcid/benadryl for 48 hours  Return to ER with worsening symptoms   · Outpatient follow up with allergist    Asthma  Assessment & Plan  · Monitor respiratory status closely in the setting of anaphylactic reaction  · No wheezing at this time, per ER notation was wheezing on arrival    Hypothyroidism  Assessment & Plan  · Continue synthroid    Hypertension  Assessment & Plan  · Continue lisinopril       Hyperlipemia  Assessment & Plan  · Continue zocor        Discharging Physician / Practitioner: Jabari Jean-Baptiste PA-C  PCP: No primary care provider on file    Admission Date:   Admission Orders (From admission, onward)     Ordered        08/13/20 1553  Place in Observation  Once                   Discharge Date: 08/14/20    Resolved Problems  Date Reviewed: 8/14/2020    None          Consultations During Hospital Stay:  · ENT    Procedures Performed:   · none    Significant Findings / Test Results:   · See above    Incidental Findings:   · none     Test Results Pending at Discharge (will require follow up):   · none     Outpatient Tests Requested:  · F/u PCP  · F/u allergist     Complications:  none    Reason for Admission: allergic reaction    Hospital Course:     Anneliese Mills is a 67 y o  female patient who originally presented to the hospital on 8/13/2020 due to allergic reaction following allergy shot for dust mites at PCP office  Was observed overnight per ENT recommendations after seeing moderate pharyngeal and supraglottic edema  Did well, on RA, tolerating diet  ENT ok with discharge with PO steroids, benadryl and pepcid for 2-3 more days  Please see above list of diagnoses and related plan for additional information  Condition at Discharge: stable     Discharge Day Visit / Exam:     Subjective:  Doing well  No complaints  Vitals: Blood Pressure: 147/65 (08/14/20 0714)  Pulse: 79 (08/14/20 0714)  Temperature: 98 2 °F (36 8 °C) (08/14/20 0714)  Temp Source: Oral (08/14/20 0714)  Respirations: (!) 25 (08/14/20 0714)  Height: 5' 2" (157 5 cm) (08/13/20 1737)  Weight - Scale: 68 7 kg (151 lb 7 3 oz) (08/13/20 1737)  SpO2: 97 % (08/14/20 0714)  Exam:   Physical Exam  Vitals signs and nursing note reviewed  Constitutional:       General: She is not in acute distress  Appearance: She is not toxic-appearing  Comments: On RA, no distress, speaks in full sentences    Neck:      Comments: No stridor   Cardiovascular:      Rate and Rhythm: Normal rate and regular rhythm  Pulmonary:      Breath sounds: No stridor  No wheezing  Neurological:      Mental Status: She is oriented to person, place, and time  Discussion with Family: patient   at bedside  Discharge instructions/Information to patient and family:   See after visit summary for information provided to patient and family  Provisions for Follow-Up Care:  See after visit summary for information related to follow-up care and any pertinent home health orders        Disposition:     Home    For Discharges to [facility] SNF:   · Not Applicable to this Patient - Not Applicable to this Patient    Planned Readmission: no     Discharge Statement:  I spent 33 minutes discharging the patient  This time was spent on the day of discharge  I had direct contact with the patient on the day of discharge  Greater than 50% of the total time was spent examining patient, answering all patient questions, arranging and discussing plan of care with patient as well as directly providing post-discharge instructions  Additional time then spent on discharge activities  Discharge Medications:  See after visit summary for reconciled discharge medications provided to patient and family        ** Please Note: This note has been constructed using a voice recognition system **

## 2020-08-14 NOTE — ASSESSMENT & PLAN NOTE
Secondary to allergic injection at PCP office (dust mite serum injection prescribed by allergist and administered in PCP office)  · Has been on escalating doses since June/July of the same injection  · Was wheezing on arrival to the ER + uvular edema  No resp distress  · Status post 2 doses of epinephrine 0 1 mg at the PCP office and then 0 3 mg in the ER, 100 mg of IV Benadryl, 20 mg of IV Pepcid and 1 L of NSS  · Also received albuterol, Atrovent and Toradol  · S/P IV steroids, pepcid and benadryl   · ENT consulted in ER and to perform nasopharyngoscopy  · Doing well, d/w ENT  Tolerating diet  [id number] Xenia Brennan for discharge on oral steroids/pepcid/benadryl for 48 hours   Return to ER with worsening symptoms   · Outpatient follow up with allergist

## 2020-08-14 NOTE — PROGRESS NOTES
S: Patient admits to significant improvement overnight, She presently denies cough, wheezing, SOB, or difficulty swallowing  O: Patient seen in ICU bed 1  Awake, alert, OOB in chair  Nares patent bilaterally  Oral mucosa moist, tongue mobile without edema  Oropharynx clear, soft palate and uvula now appear grossly normal   Neck supple  Trachea grossly midline  A: Patient s/p anaphylaxis due to dust mite allergy extract injection, much improved  P: Stable from ENT standpoint  Consider d/c home later today, with brief course of oral steroids, famotidine, and Benedryl (or long-acting antihistamine)  Recommend patient follow-up with her Allergist, Dr Mariel Anne, on Monday 8/17